# Patient Record
Sex: MALE | Employment: UNEMPLOYED | URBAN - METROPOLITAN AREA
[De-identification: names, ages, dates, MRNs, and addresses within clinical notes are randomized per-mention and may not be internally consistent; named-entity substitution may affect disease eponyms.]

---

## 2023-04-11 ENCOUNTER — HOSPITAL ENCOUNTER (EMERGENCY)
Age: 63
Discharge: HOME | End: 2023-04-11
Attending: EMERGENCY MEDICINE
Payer: MEDICAID

## 2023-04-11 VITALS
WEIGHT: 130 LBS | TEMPERATURE: 99 F | SYSTOLIC BLOOD PRESSURE: 133 MMHG | HEIGHT: 63 IN | BODY MASS INDEX: 23.04 KG/M2 | RESPIRATION RATE: 18 BRPM | HEART RATE: 96 BPM | OXYGEN SATURATION: 96 % | DIASTOLIC BLOOD PRESSURE: 75 MMHG

## 2023-04-11 DIAGNOSIS — T20.10XA FACIAL BURN, FIRST DEGREE, INITIAL ENCOUNTER: Primary | ICD-10-CM

## 2023-04-11 DIAGNOSIS — T22.191A SUPERFICIAL BURNS OF MULTIPLE SITES OF RIGHT UPPER EXTREMITY, INITIAL ENCOUNTER: ICD-10-CM

## 2023-04-11 PROCEDURE — 16020 DRESS/DEBRID P-THICK BURN S: CPT

## 2023-04-11 PROCEDURE — 99283 EMERGENCY DEPT VISIT LOW MDM: CPT

## 2023-04-11 PROCEDURE — 96372 THER/PROPH/DIAG INJ SC/IM: CPT

## 2023-04-11 PROCEDURE — 90471 IMMUNIZATION ADMIN: CPT

## 2023-04-11 NOTE — DISCHARGE INSTRUCTIONS
Please wash areas with gentle soap. Apply over-the-counter bacitracin/Neosporin no more than twice a day. Follow-up with the [facility] burn center as discussed. Ibuprofen for pain.